# Patient Record
Sex: MALE | Race: WHITE | NOT HISPANIC OR LATINO | Employment: FULL TIME | URBAN - METROPOLITAN AREA
[De-identification: names, ages, dates, MRNs, and addresses within clinical notes are randomized per-mention and may not be internally consistent; named-entity substitution may affect disease eponyms.]

---

## 2018-10-24 ENCOUNTER — HOSPITAL ENCOUNTER (EMERGENCY)
Facility: HOSPITAL | Age: 28
Discharge: HOME | End: 2018-10-24
Attending: EMERGENCY MEDICINE | Admitting: EMERGENCY MEDICINE
Payer: COMMERCIAL

## 2018-10-24 ENCOUNTER — APPOINTMENT (EMERGENCY)
Dept: CT IMAGING | Facility: HOSPITAL | Age: 28
End: 2018-10-24
Payer: COMMERCIAL

## 2018-10-24 ENCOUNTER — OFFICE VISIT (OUTPATIENT)
Dept: URGENT CARE | Facility: CLINIC | Age: 28
End: 2018-10-24
Payer: COMMERCIAL

## 2018-10-24 ENCOUNTER — TELEPHONE (OUTPATIENT)
Dept: UROLOGY | Facility: MEDICAL CENTER | Age: 28
End: 2018-10-24

## 2018-10-24 VITALS
BODY MASS INDEX: 21.67 KG/M2 | RESPIRATION RATE: 20 BRPM | TEMPERATURE: 97.8 F | OXYGEN SATURATION: 96 % | DIASTOLIC BLOOD PRESSURE: 72 MMHG | WEIGHT: 160 LBS | HEIGHT: 72 IN | HEART RATE: 64 BPM | SYSTOLIC BLOOD PRESSURE: 122 MMHG

## 2018-10-24 VITALS
HEART RATE: 89 BPM | TEMPERATURE: 97.9 F | DIASTOLIC BLOOD PRESSURE: 57 MMHG | SYSTOLIC BLOOD PRESSURE: 121 MMHG | OXYGEN SATURATION: 98 % | RESPIRATION RATE: 18 BRPM

## 2018-10-24 DIAGNOSIS — R31.0 GROSS HEMATURIA: ICD-10-CM

## 2018-10-24 DIAGNOSIS — R35.0 URINARY FREQUENCY: ICD-10-CM

## 2018-10-24 DIAGNOSIS — R10.9 RIGHT FLANK PAIN: Primary | ICD-10-CM

## 2018-10-24 DIAGNOSIS — K52.9 COLITIS, ACUTE: Primary | ICD-10-CM

## 2018-10-24 DIAGNOSIS — R31.29 MICROHEMATURIA: ICD-10-CM

## 2018-10-24 LAB
ANION GAP SERPL CALCULATED.3IONS-SCNC: 6 MMOL/L (ref 4–13)
BASOPHILS # BLD AUTO: 0 THOUSANDS/ΜL (ref 0–0.1)
BASOPHILS NFR BLD AUTO: 1 % (ref 0–2)
BUN SERPL-MCNC: 20 MG/DL (ref 7–25)
CALCIUM SERPL-MCNC: 9.6 MG/DL (ref 8.6–10.5)
CHLORIDE SERPL-SCNC: 101 MMOL/L (ref 98–107)
CO2 SERPL-SCNC: 32 MMOL/L (ref 21–31)
CREAT SERPL-MCNC: 1.02 MG/DL (ref 0.7–1.3)
EOSINOPHIL # BLD AUTO: 0.1 THOUSAND/ΜL (ref 0–0.61)
EOSINOPHIL NFR BLD AUTO: 2 % (ref 0–5)
ERYTHROCYTE [DISTWIDTH] IN BLOOD BY AUTOMATED COUNT: 11.6 % (ref 11.5–14.5)
GFR SERPL CREATININE-BSD FRML MDRD: 101 ML/MIN/1.73SQ M
GLUCOSE SERPL-MCNC: 89 MG/DL (ref 65–99)
HCT VFR BLD AUTO: 47.9 % (ref 36.5–49.3)
HGB BLD-MCNC: 16 G/DL (ref 14–18)
LYMPHOCYTES # BLD AUTO: 1.2 THOUSANDS/ΜL (ref 0.6–4.47)
LYMPHOCYTES NFR BLD AUTO: 24 % (ref 21–51)
MCH RBC QN AUTO: 31.6 PG (ref 26–34)
MCHC RBC AUTO-ENTMCNC: 33.3 G/DL (ref 31–37)
MCV RBC AUTO: 95 FL (ref 81–99)
MONOCYTES # BLD AUTO: 0.5 THOUSAND/ΜL (ref 0.17–1.22)
MONOCYTES NFR BLD AUTO: 10 % (ref 2–12)
NEUTROPHILS # BLD AUTO: 3.2 THOUSANDS/ΜL (ref 1.4–6.5)
NEUTS SEG NFR BLD AUTO: 64 % (ref 42–75)
NRBC BLD AUTO-RTO: 0 /100 WBCS
PLATELET # BLD AUTO: 229 THOUSANDS/UL (ref 149–390)
PMV BLD AUTO: 7.8 FL (ref 8.6–11.7)
POTASSIUM SERPL-SCNC: 3.9 MMOL/L (ref 3.5–5.5)
RBC # BLD AUTO: 5.05 MILLION/UL (ref 4.3–5.9)
SL AMB  POCT GLUCOSE, UA: ABNORMAL
SL AMB LEUKOCYTE ESTERASE,UA: ABNORMAL
SL AMB POCT BILIRUBIN,UA: ABNORMAL
SL AMB POCT BLOOD,UA: ABNORMAL
SL AMB POCT CLARITY,UA: CLEAR
SL AMB POCT COLOR,UA: YELLOW
SL AMB POCT KETONES,UA: ABNORMAL
SL AMB POCT NITRITE,UA: ABNORMAL
SL AMB POCT PH,UA: 6
SL AMB POCT SPECIFIC GRAVITY,UA: 1.02
SL AMB POCT URINE PROTEIN: ABNORMAL
SL AMB POCT UROBILINOGEN: 0.2
SODIUM SERPL-SCNC: 139 MMOL/L (ref 134–143)
WBC # BLD AUTO: 5 THOUSAND/UL (ref 4.8–10.8)

## 2018-10-24 PROCEDURE — 87086 URINE CULTURE/COLONY COUNT: CPT | Performed by: PHYSICIAN ASSISTANT

## 2018-10-24 PROCEDURE — S9088 SERVICES PROVIDED IN URGENT: HCPCS | Performed by: PHYSICIAN ASSISTANT

## 2018-10-24 PROCEDURE — 74176 CT ABD & PELVIS W/O CONTRAST: CPT

## 2018-10-24 PROCEDURE — 96375 TX/PRO/DX INJ NEW DRUG ADDON: CPT

## 2018-10-24 PROCEDURE — 99203 OFFICE O/P NEW LOW 30 MIN: CPT | Performed by: PHYSICIAN ASSISTANT

## 2018-10-24 PROCEDURE — 36415 COLL VENOUS BLD VENIPUNCTURE: CPT | Performed by: EMERGENCY MEDICINE

## 2018-10-24 PROCEDURE — 99284 EMERGENCY DEPT VISIT MOD MDM: CPT

## 2018-10-24 PROCEDURE — 85025 COMPLETE CBC W/AUTO DIFF WBC: CPT | Performed by: EMERGENCY MEDICINE

## 2018-10-24 PROCEDURE — 81002 URINALYSIS NONAUTO W/O SCOPE: CPT | Performed by: PHYSICIAN ASSISTANT

## 2018-10-24 PROCEDURE — 96374 THER/PROPH/DIAG INJ IV PUSH: CPT

## 2018-10-24 PROCEDURE — 80048 BASIC METABOLIC PNL TOTAL CA: CPT | Performed by: EMERGENCY MEDICINE

## 2018-10-24 RX ORDER — KETOROLAC TROMETHAMINE 30 MG/ML
30 INJECTION, SOLUTION INTRAMUSCULAR; INTRAVENOUS ONCE
Status: COMPLETED | OUTPATIENT
Start: 2018-10-24 | End: 2018-10-24

## 2018-10-24 RX ORDER — TRAMADOL HYDROCHLORIDE 50 MG/1
50 TABLET ORAL EVERY 8 HOURS PRN
Qty: 12 TABLET | Refills: 0 | Status: SHIPPED | OUTPATIENT
Start: 2018-10-24 | End: 2018-10-31

## 2018-10-24 RX ORDER — METRONIDAZOLE 500 MG/1
500 TABLET ORAL EVERY 12 HOURS SCHEDULED
Qty: 14 TABLET | Refills: 0 | Status: SHIPPED | OUTPATIENT
Start: 2018-10-24 | End: 2018-10-31

## 2018-10-24 RX ORDER — MORPHINE SULFATE 4 MG/ML
4 INJECTION, SOLUTION INTRAMUSCULAR; INTRAVENOUS ONCE
Status: COMPLETED | OUTPATIENT
Start: 2018-10-24 | End: 2018-10-24

## 2018-10-24 RX ORDER — CIPROFLOXACIN 500 MG/1
500 TABLET, FILM COATED ORAL ONCE
Status: COMPLETED | OUTPATIENT
Start: 2018-10-24 | End: 2018-10-24

## 2018-10-24 RX ORDER — CIPROFLOXACIN 500 MG/1
500 TABLET, FILM COATED ORAL 2 TIMES DAILY
Qty: 14 TABLET | Refills: 0 | Status: SHIPPED | OUTPATIENT
Start: 2018-10-24 | End: 2018-10-31

## 2018-10-24 RX ORDER — METRONIDAZOLE 500 MG/1
500 TABLET ORAL ONCE
Status: COMPLETED | OUTPATIENT
Start: 2018-10-24 | End: 2018-10-24

## 2018-10-24 RX ADMIN — CIPROFLOXACIN HYDROCHLORIDE 500 MG: 500 TABLET, FILM COATED ORAL at 12:19

## 2018-10-24 RX ADMIN — METRONIDAZOLE 500 MG: 500 TABLET, FILM COATED ORAL at 12:19

## 2018-10-24 RX ADMIN — KETOROLAC TROMETHAMINE 30 MG: 30 INJECTION, SOLUTION INTRAMUSCULAR at 10:12

## 2018-10-24 RX ADMIN — MORPHINE SULFATE 4 MG: 4 INJECTION INTRAVENOUS at 11:30

## 2018-10-24 NOTE — TELEPHONE ENCOUNTER
Spoke with patient to schedule appointment for microhematuria  Patient scheduled 11/15/2018 at 0800 with Dr Yahir Aranda at the [place] office  Please send new patient paperwork     Thanks

## 2018-10-24 NOTE — ED PROVIDER NOTES
History  Chief Complaint   Patient presents with    Flank Pain     right  started Sunday getting worse  34 yr male with complaints of onset of sharp right flank pain x2 days some radiation to the right side of his abdomen  Pain is worse today  No dysuria, frequency or hematuria but states he had a UA an outpatient urgent care which showed some blood microscopically  No fever chills  No nausea vomiting diarrhea  No, congestion, chest pain or shortness of breath  History provided by:  Patient  Flank Pain   Pain location:  R flank  Pain quality: aching and sharp    Pain radiation: Right abdomen  Pain severity:  Moderate  Onset quality:  Gradual  Duration:  2 days  Timing:  Intermittent  Progression:  Waxing and waning  Chronicity:  New  Context: not recent travel and not sick contacts    Relieved by:  Nothing  Worsened by:  Nothing  Associated symptoms: no chest pain, no chills, no constipation, no cough, no diarrhea, no dysuria, no fever, no hematuria, no nausea, no shortness of breath, no sore throat and no vomiting        None       History reviewed  No pertinent past medical history  History reviewed  No pertinent surgical history  History reviewed  No pertinent family history  I have reviewed and agree with the history as documented  Social History   Substance Use Topics    Smoking status: Never Smoker    Smokeless tobacco: Never Used    Alcohol use Yes      Comment: social        Review of Systems   Constitutional: Negative for chills and fever  HENT: Negative for rhinorrhea and sore throat  Eyes: Negative for visual disturbance  Respiratory: Negative for cough and shortness of breath  Cardiovascular: Negative for chest pain and leg swelling  Gastrointestinal: Positive for abdominal pain  Negative for constipation, diarrhea, nausea and vomiting  Genitourinary: Positive for flank pain  Negative for dysuria and hematuria     Musculoskeletal: Negative for back pain and myalgias  Skin: Negative for rash  Neurological: Negative for dizziness and headaches  Psychiatric/Behavioral: Negative for confusion  All other systems reviewed and are negative  Physical Exam  Physical Exam   Constitutional: He is oriented to person, place, and time  He appears well-developed and well-nourished  HENT:   Nose: Nose normal    Mouth/Throat: Oropharynx is clear and moist  No oropharyngeal exudate  Eyes: Pupils are equal, round, and reactive to light  Conjunctivae and EOM are normal  No scleral icterus  Neck: Normal range of motion  Neck supple  No JVD present  No tracheal deviation present  Cardiovascular: Normal rate, regular rhythm and normal heart sounds  No murmur heard  Pulmonary/Chest: Effort normal and breath sounds normal  No respiratory distress  He has no wheezes  He has no rales  Abdominal: Soft  Bowel sounds are normal  There is no tenderness  There is no guarding  Positive right CVA tenderness   Musculoskeletal: Normal range of motion  He exhibits no edema or tenderness  No spinal tenderness   Neurological: He is alert and oriented to person, place, and time  No cranial nerve deficit or sensory deficit  He exhibits normal muscle tone  5/5 motor, nl sens   Skin: Skin is warm and dry  Psychiatric: He has a normal mood and affect  His behavior is normal    Nursing note and vitals reviewed        Vital Signs  ED Triage Vitals [10/24/18 0944]   Temperature Pulse Respirations Blood Pressure SpO2   98 3 °F (36 8 °C) 78 20 129/77 99 %      Temp Source Heart Rate Source Patient Position - Orthostatic VS BP Location FiO2 (%)   Temporal -- -- -- --      Pain Score       8           Vitals:    10/24/18 0944 10/24/18 1227   BP: 129/77 122/72   Pulse: 78 64       Visual Acuity      ED Medications  Medications   ketorolac (TORADOL) injection 30 mg (30 mg Intravenous Given 10/24/18 1012)   morphine (PF) 4 mg/mL injection 4 mg (4 mg Intravenous Given 10/24/18 1130) ciprofloxacin (CIPRO) tablet 500 mg (500 mg Oral Given 10/24/18 1219)   metroNIDAZOLE (FLAGYL) tablet 500 mg (500 mg Oral Given 10/24/18 1219)       Diagnostic Studies  Results Reviewed     Procedure Component Value Units Date/Time    Basic metabolic panel [[id number]]  (Abnormal) Collected:  10/24/18 1004    Lab Status:  Final result Specimen:  Blood from Arm, Right Updated:  10/24/18 1101     Sodium 139 mmol/L      Potassium 3 9 mmol/L      Chloride 101 mmol/L      CO2 32 (H) mmol/L      ANION GAP 6 mmol/L      BUN 20 mg/dL      Creatinine 1 02 mg/dL      Glucose 89 mg/dL      Calcium 9 6 mg/dL      eGFR 101 ml/min/1 73sq m     Narrative:         National Kidney Disease Education Program recommendations are as follows:  GFR calculation is accurate only with a steady state creatinine  Chronic Kidney disease less than 60 ml/min/1 73 sq  meters  Kidney failure less than 15 ml/min/1 73 sq  meters      CBC and differential [[id number]]  (Abnormal) Collected:  10/24/18 1004    Lab Status:  Final result Specimen:  Blood from Arm, Right Updated:  10/24/18 1024     WBC 5 00 Thousand/uL      RBC 5 05 Million/uL      Hemoglobin 16 0 g/dL      Hematocrit 47 9 %      MCV 95 fL      MCH 31 6 pg      MCHC 33 3 g/dL      RDW 11 6 %      MPV 7 8 (L) fL      Platelets 370 Thousands/uL      nRBC 0 /100 WBCs      Neutrophils Relative 64 %      Lymphocytes Relative 24 %      Monocytes Relative 10 %      Eosinophils Relative 2 %      Basophils Relative 1 %      Neutrophils Absolute 3 20 Thousands/µL      Lymphocytes Absolute 1 20 Thousands/µL      Monocytes Absolute 0 50 Thousand/µL      Eosinophils Absolute 0 10 Thousand/µL      Basophils Absolute 0 00 Thousands/µL                  CT abdomen pelvis wo contrast   Final Result by Amanda Joshi MD (10/24 1124)   Findings suggestive of colitis with inflammatory changes involving the   pericecal region with normal-caliber appendix and moderate appearing   distal transverse colon inflammatory changes with moderate wall thickening   and narrowed lumen suggested  Clinical correlation recommended  If   clinically indicated, CT abdomen and pelvis with IV and GI contrast may be   more helpful for further evaluation  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  No   hydronephrosis or hydroureter identified with above limitations  Limitations above  Signed by Pat Jewell MD                 Procedures  Procedures       Phone Contacts  ED Phone Contact    ED Course  ED Course as of Oct 24 1240   Wed Oct 24, 2018   1211 Outpt UA reviewed - cx pending    1211 Pt comfortable  Results of CT reviewed - Rx cipro, flagyl, ultram   Pt understands the need for outpt GI follow-up for colonoscopy and to return if he worsens  1212 CT abdomen pelvis wo contrast   7914 Will also f/u with urology given microhematuria and nonspecific UPJ findings on CT                                MDM  CritCare Time    Disposition  Final diagnoses:   Colitis, acute   Microhematuria     Time reflects when diagnosis was documented in both MDM as applicable and the Disposition within this note     Time User Action Codes Description Comment    10/24/2018 11:29 AM Kristineso العراقي A Add [K52 9] Colitis, acute     10/24/2018 12:13 PM Kristine Fesmarla A Add [R31 29] Microhematuria       ED Disposition     ED Disposition Condition Comment    Discharge  Lisa Hasting discharge to home/self care  Condition at discharge: Stable        Follow-up Information     Follow up With Specialties Details Why [address] [facility] Schedule an appointment as soon as possible for a visit in 1 day Return if you worsen or any new problems occur  Take medications as directed  Make an appointment to see a gastroenterologist as soon as possible for colonoscopy   [address] [id number] [phone] [facility] [address]  [address], [place], [place], [address] [facility] Schedule an appointment as soon as possible for a visit in 1 day For further evaluation of your blood in your urine  [address] [facility], [address] [place] [address], [phone]          Discharge Medication List as of 10/24/2018 12:18 PM      START taking these medications    Details   ciprofloxacin (CIPRO) 500 mg tablet Take 1 tablet (500 mg total) by mouth 2 (two) times a day for 7 days, Starting Wed 10/24/2018, Until Wed 10/31/2018, Normal      metroNIDAZOLE (FLAGYL) 500 mg tablet Take 1 tablet (500 mg total) by mouth every 12 (twelve) hours for 7 days, Starting Wed 10/24/2018, Until Wed 10/31/2018, Normal      traMADol (ULTRAM) 50 mg tablet Take 1 tablet (50 mg total) by mouth every 8 (eight) hours as needed for moderate pain for up to 12 doses, Starting Wed 10/24/2018, Print           No discharge procedures on file      ED Provider  Electronically Signed by           Esther Forbes MD  10/24/18 2810

## 2018-10-24 NOTE — PROGRESS NOTES
[facility]    NAME: Girard Homans is a 34 y o  male  : 1990    MRN: [id number]  DATE: 2018  TIME: 10:24 AM    Assessment and Plan   Right flank pain [R10 9]  1  Right flank pain  Transfer to other facility   2  Gross hematuria  Transfer to other facility   3  Urinary frequency  Urine culture    POCT urine dip       Patient Instructions     Patient Instructions   Referred patient to the emergency room for further evaluation of flank pain  Need to rule out kidney stone  He agrees and is going to go to go to [facility]       Chief Complaint     Chief Complaint   Patient presents with    Back Pain     Pt c/o mid back pain since   History of Present Illness   44-year-old male here with complaint of right back/flank pain for the last 4-5 days  Patient states that started out at 4 to 5/10 and in the past 2 days has increased to 7/10  Pain does not change with movements  It is there constantly  Patient just got over an upper respiratory infection last week  He denies any fever or chills  Denies any radiation of the pain  Denies nausea or vomiting  Pain does not change with eating  Denies any diarrhea or bowel changes  Noticed that his urine was darker yesterday  Denies any burning with urination  Review of Systems   Review of Systems   Constitutional: Negative for activity change, appetite change, chills, diaphoresis, fatigue, fever and unexpected weight change  HENT: Negative for congestion, ear pain, hearing loss, sinus pressure, sneezing, sore throat and tinnitus  Eyes: Negative for photophobia, redness and visual disturbance  Respiratory: Negative for apnea, cough, chest tightness, shortness of breath, wheezing and stridor  Cardiovascular: Negative for chest pain, palpitations and leg swelling  Gastrointestinal: Positive for abdominal pain  Negative for abdominal distention, blood in stool, constipation, diarrhea, nausea and vomiting  Endocrine: Negative for cold intolerance, heat intolerance, polydipsia, polyphagia and polyuria  Genitourinary: Positive for flank pain and hematuria  Negative for difficulty urinating, dysuria and urgency  Musculoskeletal: Positive for back pain  Negative for arthralgias, gait problem, myalgias, neck pain and neck stiffness  Skin: Negative for rash and wound  Neurological: Negative for dizziness, tremors, seizures, syncope, weakness, light-headedness, numbness and headaches  Hematological: Negative for adenopathy  Does not bruise/bleed easily  Psychiatric/Behavioral: Negative for agitation, behavioral problems, confusion and decreased concentration  The patient is not nervous/anxious  All other systems reviewed and are negative  Current Medications   No current facility-administered medications for this visit  No current outpatient prescriptions on file  Current Allergies     Allergies as of 10/24/2018    (No Known Allergies)          The following portions of the patient's history were reviewed and updated as appropriate: allergies, current medications, past family history, past medical history, past social history, past surgical history and problem list    No past medical history on file  No past surgical history on file  No family history on file  Social History     Social History    Marital status: Single     Spouse name: N/A    Number of children: N/A    Years of education: N/A     Occupational History    Not on file  Social History Main Topics    Smoking status: Never Smoker    Smokeless tobacco: Never Used    Alcohol use Yes      Comment: social    Drug use: Yes     Types: Marijuana    Sexual activity: Not on file     Other Topics Concern    Not on file     Social History Narrative    No narrative on file     Medications have been verified      Objective   /57   Pulse 89   Temp 97 9 °F (36 6 °C)   Resp 18   SpO2 98%      Physical Exam   Physical Exam Constitutional: He appears well-developed and well-nourished  No distress  HENT:   Head: Normocephalic  Right Ear: External ear normal    Left Ear: External ear normal    Nose: Nose normal    Mouth/Throat: Oropharynx is clear and moist  No oropharyngeal exudate  Neck: Normal range of motion  Neck supple  Cardiovascular: Normal rate, regular rhythm and normal heart sounds  No murmur heard  Pulmonary/Chest: Effort normal and breath sounds normal  No respiratory distress  He has no wheezes  He has no rales  Abdominal: Soft  Bowel sounds are normal  There is tenderness in the right upper quadrant  There is CVA tenderness (Right flank)  Musculoskeletal: Normal range of motion  Lymphadenopathy:     He has no cervical adenopathy  Skin: Skin is warm  No rash noted  Vitals reviewed

## 2018-10-24 NOTE — TELEPHONE ENCOUNTER
Reason for appointment/Complaint/Diagnosis : Right sided flank pain, micro hematuria     Insurance:     History of Cancer?  No                   If yes, what kind? N/A    Previous urologist?  No                  Records requested/where? No    Outside testing/where? No    Location Preference for office visit? [facility]     ** Pt visited [facility] 10/24 for flank pain, has CBC, BMP and Urine Culture there, discovered micro hematuria, says to fup in 1 day, please advise    [phone]    KT

## 2018-10-24 NOTE — DISCHARGE INSTRUCTIONS
Colitis   WHAT YOU NEED TO KNOW:   Colitis is swelling and irritation of your colon  Colitis may be caused by ulcers or a problem with your immune system  Bacteria, a virus, or a parasite may also cause colitis  The cause may not be known  You may have diarrhea, abdominal pain, fever, or blood or mucus in your bowel movement  DISCHARGE INSTRUCTIONS:   Return to the emergency department if:   · You have sudden trouble breathing  · Your bowel movements are black or have blood in them  · You have blood in your vomit  · You have severe abdominal pain or your abdomen is swollen and feels hard  · You have any of the following signs of dehydration:     ¨ Dizziness or weakness    ¨ Dry mouth, cracked lips, or severe thirst    ¨ Fast heartbeat or breathing    ¨ Urinating very little or not at all  Contact your healthcare provider if:   · Your symptoms get worse or do not go away  · You have a fever, chills, cough, or feel weak and achy  · You suddenly lose weight without trying  · You have questions or concerns about your condition or care  Medicines:   · Medicines  may be given to decrease inflammation in your colon and treat diarrhea  · Take your medicine as directed  Contact your healthcare provider if you think your medicine is not helping or if you have side effects  Tell him of her if you are allergic to any medicine  Keep a list of the medicines, vitamins, and herbs you take  Include the amounts, and when and why you take them  Bring the list or the pill bottles to follow-up visits  Carry your medicine list with you in case of an emergency  Manage your symptoms:   · Drink liquids as directed  to help prevent dehydration  Good liquids to drink include water, juice, and broth  Ask how much liquid to drink each day  You may need to drink an oral rehydration solution (ORS)  An ORS contains a balance of water, salt, and sugar to replace body fluids lost during diarrhea       · Eat a variety of healthy foods  Healthy foods include fruits, vegetables, whole-grain breads, beans, low-fat dairy products, lean meats, and fish  You may need to eat several small meals throughout the day instead of large meals  Avoid spicy foods, caffeine, chocolate, and foods high in fat  · Talk to your healthcare provider before you take NSAIDs  NSAIDs can cause worsen your symptoms if ulcers are causing your colitis  · Start to exercise when you feel better  Regular exercise helps your bowels work normally  Ask about the best exercise plan for you  Follow up with your healthcare provider as directed: You may need to return for a colonoscopy or other tests  Write down how often you have a bowel movements and what they look like  Bring this to your follow-up visits  Write down your questions so you remember to ask them during your visits  © 2017 [address] [employer] Information is for End User's use only and may not be sold, redistributed or otherwise used for commercial purposes  All illustrations and images included in CareNotes® are the copyrighted property of A D A M , Inc  or [employer]isten Banuelos  The above information is an  only  It is not intended as medical advice for individual conditions or treatments  Talk to your doctor, nurse or pharmacist before following any medical regimen to see if it is safe and effective for you  Hematuria   WHAT YOU NEED TO KNOW:   Hematuria is blood in your urine  Your urine may be bright red to dark brown  DISCHARGE INSTRUCTIONS:   Return to the emergency department if:   · You have blood in your urine after a new injury, such as a fall  · You are urinating very small amounts or not at all  · You feel like you cannot empty your bladder  · You have severe back or side pain that does not go away with treatment  Contact your healthcare provider if:   · You have a fever that gets worse or does not go away with treatment       · You cannot keep liquids or medicines down  · Your urine gets darker, even after you drink extra liquids  · You have questions or concerns about your condition, treatment, or care  Drink liquids as directed: You may need to drink extra liquids to help flush the blood from your body through your urine  Water is the best liquid to drink  Ask how much liquid to drink each day and which liquids are best for you  Follow up with your healthcare provider as directed:  Write down your questions so you remember to ask them during your visits  © 2017 [address] [employer] Information is for End User's use only and may not be sold, redistributed or otherwise used for commercial purposes  All illustrations and images included in CareNotes® are the copyrighted property of A D A M [employer] Inc  or Tristen Banuelos  The above information is an  only  It is not intended as medical advice for individual conditions or treatments  Talk to your doctor, nurse or pharmacist before following any medical regimen to see if it is safe and effective for you

## 2018-10-24 NOTE — PATIENT INSTRUCTIONS
Referred patient to the emergency room for further evaluation of flank pain  Need to rule out kidney stone    He agrees and is going to go to go to [facility]

## 2018-10-25 NOTE — TELEPHONE ENCOUNTER
Clifton Everett  Patient is scheduled now  I asked [employer] to double book patient for me and she did it this morning  Please send new patient paperwork    Thank You

## 2018-10-26 LAB — BACTERIA UR CULT: NORMAL

## 2018-10-31 ENCOUNTER — OFFICE VISIT (OUTPATIENT)
Dept: GASTROENTEROLOGY | Facility: HOSPITAL | Age: 28
End: 2018-10-31
Payer: COMMERCIAL

## 2018-10-31 VITALS
HEART RATE: 89 BPM | TEMPERATURE: 97.8 F | HEIGHT: 72 IN | BODY MASS INDEX: 21.4 KG/M2 | WEIGHT: 158 LBS | DIASTOLIC BLOOD PRESSURE: 72 MMHG | SYSTOLIC BLOOD PRESSURE: 125 MMHG

## 2018-10-31 DIAGNOSIS — R10.84 GENERALIZED ABDOMINAL PAIN: ICD-10-CM

## 2018-10-31 DIAGNOSIS — R93.3 ABNORMAL CT SCAN, COLON: ICD-10-CM

## 2018-10-31 DIAGNOSIS — R19.7 DIARRHEA, UNSPECIFIED TYPE: Primary | ICD-10-CM

## 2018-10-31 PROCEDURE — 99204 OFFICE O/P NEW MOD 45 MIN: CPT | Performed by: INTERNAL MEDICINE

## 2018-11-15 ENCOUNTER — OFFICE VISIT (OUTPATIENT)
Dept: UROLOGY | Facility: CLINIC | Age: 28
End: 2018-11-15
Payer: COMMERCIAL

## 2018-11-15 VITALS
SYSTOLIC BLOOD PRESSURE: 128 MMHG | BODY MASS INDEX: 21.94 KG/M2 | DIASTOLIC BLOOD PRESSURE: 80 MMHG | HEIGHT: 72 IN | WEIGHT: 162 LBS | HEART RATE: 76 BPM

## 2018-11-15 DIAGNOSIS — R31.29 MICROHEMATURIA: Primary | ICD-10-CM

## 2018-11-15 DIAGNOSIS — R19.7 DIARRHEA, UNSPECIFIED TYPE: ICD-10-CM

## 2018-11-15 DIAGNOSIS — B36.9 FUNGAL RASH OF TORSO: ICD-10-CM

## 2018-11-15 DIAGNOSIS — M41.26 OTHER IDIOPATHIC SCOLIOSIS, LUMBAR REGION: ICD-10-CM

## 2018-11-15 LAB
SL AMB  POCT GLUCOSE, UA: NORMAL
SL AMB LEUKOCYTE ESTERASE,UA: NORMAL
SL AMB POCT BILIRUBIN,UA: NORMAL
SL AMB POCT BLOOD,UA: NORMAL
SL AMB POCT CLARITY,UA: CLEAR
SL AMB POCT COLOR,UA: YELLOW
SL AMB POCT KETONES,UA: NORMAL
SL AMB POCT NITRITE,UA: NORMAL
SL AMB POCT PH,UA: 5
SL AMB POCT SPECIFIC GRAVITY,UA: 1.02
SL AMB POCT URINE PROTEIN: NORMAL
SL AMB POCT UROBILINOGEN: NORMAL

## 2018-11-15 PROCEDURE — 99244 OFF/OP CNSLTJ NEW/EST MOD 40: CPT | Performed by: UROLOGY

## 2018-11-15 PROCEDURE — 81002 URINALYSIS NONAUTO W/O SCOPE: CPT | Performed by: UROLOGY

## 2018-11-15 RX ORDER — NYSTATIN 100000 [USP'U]/G
POWDER TOPICAL 2 TIMES DAILY
Qty: 15 G | Refills: 0 | Status: SHIPPED | OUTPATIENT
Start: 2018-11-15

## 2018-11-15 NOTE — PROGRESS NOTES
Cathie Batch NOTE     CHIEF COMPLAINT   Claudia Jacksno is a 34 y o  male with a complaint of   Chief Complaint   Patient presents with    Microhematuria    Back Pain       History of Present Illness:     34 y o  male with a longstanding 8 or 9 year history of back pain  This has progressed over the last year  The patient feels well when he wakes from sleep but progressively over the day has lower lumbar pain  He has been using topical patches  He recently underwent a CT scan which demonstrated medullary calcinosis of the kidneys but no obstructing stone  The patient had the appearance of colitis or inflammation of the colon  He was also noted to have dip positive blood in his urine at that time  He denies all urinary symptoms  He has been having severe diarrhea for most of his life  He has seen gastroenterology and is due for colonoscopy in December  Past Medical History:     Past Medical History:   Diagnosis Date    History of heart murmur in childhood        PAST SURGICAL HISTORY:     Past Surgical History:   Procedure Laterality Date    WISDOM TOOTH EXTRACTION         CURRENT MEDICATIONS:     No current outpatient prescriptions on file  No current facility-administered medications for this visit          ALLERGIES:   No Known Allergies    SOCIAL HISTORY:     Social History     Social History    Marital status: Single     Spouse name: N/A    Number of children: N/A    Years of education: N/A     Social History Main Topics    Smoking status: Never Smoker    Smokeless tobacco: Never Used    Alcohol use Yes      Comment: social    Drug use: Yes     Types: Marijuana    Sexual activity: Not Asked     Other Topics Concern    None     Social History Narrative    None       SOCIAL HISTORY:     Family History   Problem Relation Age of Onset    Heart attack Maternal Grandmother     Cancer Maternal Grandfather     Diabetes Paternal Grandmother        REVIEW OF SYSTEMS:     Review of Systems   Constitutional: Negative  Respiratory: Negative  Cardiovascular: Negative  Gastrointestinal: Positive for diarrhea  Genitourinary: Negative  Musculoskeletal: Positive for back pain  Skin: Positive for rash  Psychiatric/Behavioral: Negative  PHYSICAL EXAM:     /80   Pulse 76   Ht 6' (1 829 m)   Wt 73 5 kg (162 lb)   BMI 21 97 kg/m²     General:  Healthy appearing male in no acute distress  They have a normal affect  There is not appear to be any gross neurologic defects or abnormalities  HEENT:  Normocephalic, atraumatic  Neck is supple without any palpable lymphadenopathy  Cardiovascular:  Patient has normal palpable distal radial pulses  There is no significant peripheral edema  No JVD is noted  Respiratory:  Patient has unlabored respirations  There is no audible wheeze or rhonchi  Abdomen:  Abdomen is without surgical scars  Abdomen is soft and nontender  There is no tympany  Inguinal and umbilical hernia are not appreciated  Genitourinary:  Circumcised phallus, orthotopic meatus, testicles descended and smooth without nodules, yeast groin rash    Musculoskeletal:  Patient does have moderate tenderness in the lumbosacral region with palpation or percussion  They full range of motion in all 4 extremities  Strength in all 4 extremities appears congruent  Patient is able to ambulate without assistance or difficulty  Dermatologic:  Patient has no skin abnormalities or rashes  LABS:     CBC:   Lab Results   Component Value Date    WBC 5 00 10/24/2018    HGB 16 0 10/24/2018    HCT 47 9 10/24/2018    MCV 95 10/24/2018     10/24/2018       BMP:   Lab Results   Component Value Date    CALCIUM 9 6 10/24/2018    K 3 9 10/24/2018    CO2 32 (H) 10/24/2018     10/24/2018    BUN 20 10/24/2018    CREATININE 1 02 10/24/2018       IMAGING:     10/24/18  INDICATION:  Right flank pain  Microscopic hematuria    Prior smoker      ORDERING PROVIDER: BRYANT SPENCE      TECHNIQUE:  CT abdomen and pelvis was performed without intravenous  contrast   Sagittal/coronal reconstructions        RADIATION AMOUNT:  271 50 mGy-cm      COMPARISON:  None available      FINDINGS: CT limited without IV contrast which decreases sensitivity for  neoplasm/infection  Abdomen: The visualized lower lung bases appear clear  Visualized distal esophagus  shows no large gross bulky lesion with above limitations      The liver, gallbladder, pancreas, and adrenals are suboptimally evaluated  on these unenhanced images, but demonstrate no acute pathology  No  hydronephrosis or hydroureter identified  However, diffuse increased  attenuation is noted of the bilateral renal corticomedullary junctions,  can be seen with medullary nephrocalcinosis  Nondistended stomach shows  no large gross bulky focal lesion      There is no free intraperitoneal air or large gross bulky lymph node  enlargement identified  Abdominal aorta is not aneurysmal grossly      Pelvis: There is no bowel wall thickening or finding of complete mechanical small  bowel obstruction identified  Appendix is identified and shows normal  caliber in the retrocecal region  Minimal medial pericecal slight  effacement of the fat planes is noted, nonspecific but may be  representative of mild inflammation of colitis  Moderate wall thickening  and mildly narrowed lumen suggested distal transverse colon, may represent  moderate inflammation of colitis with mild pericolonic effaced fat planes  suggested        There is no free fluid identified although relative paucity of adipose  tissue mildly limits evaluation in the lower abdomen and pelvis  Lymph  nodes visualized of the pelvis and inguinal regions show no large gross  bulky adenopathy with above limitations  Urinary bladder appears  moderately distended and shows no focal large gross bulky lesion or  intraluminal high attenuation calculus    Seminal vesicles appear  symmetric  Prostate grossly appears within normal limits  No  ventral/inguinal bowel hernia seen  Visualized subcutaneous and muscle  tissue show no large gross bulky lesion      Skeleton:    There are no acute fractures  No suspicious bony lesions  Mild scoliosis  noted, apex left L4-L5      IMPRESSION:  Findings suggestive of colitis with inflammatory changes involving the  pericecal region with normal-caliber appendix and moderate appearing  distal transverse colon inflammatory changes with moderate wall thickening  and narrowed lumen suggested  Clinical correlation recommended  If  clinically indicated, CT abdomen and pelvis with IV and GI contrast may be  more helpful for further evaluation      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  No  hydronephrosis or hydroureter identified with above limitations      Limitations above  ASSESSMENT:     34 y o  male with inflammatory bowel issues, progressive lumbar spinal issues with scoliosis an incidental dip positive blood in his urine    PLAN:     Patient has no urinary symptoms, no obstruction or hydronephrosis and minor medullary calcinosis  This does not appear to be urologic issue  The patient is due for a GI workup with colonoscopy which I would recommend that he continue with  I would not be surprised if he has some inflammatory or autoimmune bowel issue  Due to the lower back pain that appears to have started along with his GI issues, I will refer him to orthopedics  They can address his scoliosis and evaluate him for an inflammatory or autoimmune musculoskeletal issue  Patient will return to see me in 6 months with a formal urinalysis with microscopy  My hope is we will make some dramatic improvement in his care before then  I would not recommend any additional imaging or cystoscopy at this time  Patient has a fungal rash in the groin and so I have recommended some nystatin powder for him

## 2018-12-03 ENCOUNTER — ANESTHESIA EVENT (OUTPATIENT)
Dept: PERIOP | Facility: HOSPITAL | Age: 28
End: 2018-12-03
Payer: COMMERCIAL

## 2018-12-04 ENCOUNTER — HOSPITAL ENCOUNTER (OUTPATIENT)
Facility: HOSPITAL | Age: 28
Setting detail: OUTPATIENT SURGERY
Discharge: HOME | End: 2018-12-04
Attending: INTERNAL MEDICINE | Admitting: INTERNAL MEDICINE
Payer: COMMERCIAL

## 2018-12-04 ENCOUNTER — ANESTHESIA (OUTPATIENT)
Dept: PERIOP | Facility: HOSPITAL | Age: 28
End: 2018-12-04
Payer: COMMERCIAL

## 2018-12-04 VITALS
SYSTOLIC BLOOD PRESSURE: 133 MMHG | TEMPERATURE: 97.5 F | OXYGEN SATURATION: 99 % | RESPIRATION RATE: 18 BRPM | DIASTOLIC BLOOD PRESSURE: 65 MMHG | HEART RATE: 67 BPM

## 2018-12-04 DIAGNOSIS — R10.84 GENERALIZED ABDOMINAL PAIN: ICD-10-CM

## 2018-12-04 DIAGNOSIS — R93.3 ABNORMAL CT SCAN, COLON: ICD-10-CM

## 2018-12-04 DIAGNOSIS — R19.7 DIARRHEA, UNSPECIFIED TYPE: ICD-10-CM

## 2018-12-04 PROCEDURE — 88305 TISSUE EXAM BY PATHOLOGIST: CPT | Performed by: PATHOLOGY

## 2018-12-04 PROCEDURE — 45380 COLONOSCOPY AND BIOPSY: CPT | Performed by: INTERNAL MEDICINE

## 2018-12-04 RX ORDER — PROPOFOL 10 MG/ML
INJECTION, EMULSION INTRAVENOUS AS NEEDED
Status: DISCONTINUED | OUTPATIENT
Start: 2018-12-04 | End: 2018-12-04 | Stop reason: SURG

## 2018-12-04 RX ORDER — SODIUM CHLORIDE, SODIUM LACTATE, POTASSIUM CHLORIDE, CALCIUM CHLORIDE 600; 310; 30; 20 MG/100ML; MG/100ML; MG/100ML; MG/100ML
125 INJECTION, SOLUTION INTRAVENOUS CONTINUOUS
Status: DISCONTINUED | OUTPATIENT
Start: 2018-12-04 | End: 2018-12-04 | Stop reason: HOSPADM

## 2018-12-04 RX ORDER — PROPOFOL 10 MG/ML
INJECTION, EMULSION INTRAVENOUS CONTINUOUS PRN
Status: DISCONTINUED | OUTPATIENT
Start: 2018-12-04 | End: 2018-12-04 | Stop reason: SURG

## 2018-12-04 RX ORDER — MIDAZOLAM HYDROCHLORIDE 1 MG/ML
INJECTION INTRAMUSCULAR; INTRAVENOUS AS NEEDED
Status: DISCONTINUED | OUTPATIENT
Start: 2018-12-04 | End: 2018-12-04 | Stop reason: SURG

## 2018-12-04 RX ADMIN — PROPOFOL 50 MG: 10 INJECTION, EMULSION INTRAVENOUS at 11:36

## 2018-12-04 RX ADMIN — PROPOFOL 50 MG: 10 INJECTION, EMULSION INTRAVENOUS at 11:35

## 2018-12-04 RX ADMIN — PROPOFOL 50 MG: 10 INJECTION, EMULSION INTRAVENOUS at 11:38

## 2018-12-04 RX ADMIN — PROPOFOL 50 MG: 10 INJECTION, EMULSION INTRAVENOUS at 11:40

## 2018-12-04 RX ADMIN — SODIUM CHLORIDE, SODIUM LACTATE, POTASSIUM CHLORIDE, AND CALCIUM CHLORIDE 125 ML/HR: .6; .31; .03; .02 INJECTION, SOLUTION INTRAVENOUS at 10:41

## 2018-12-04 RX ADMIN — MIDAZOLAM HYDROCHLORIDE 2 MG: 1 INJECTION, SOLUTION INTRAMUSCULAR; INTRAVENOUS at 11:35

## 2018-12-04 RX ADMIN — PROPOFOL 50 MG: 10 INJECTION, EMULSION INTRAVENOUS at 11:43

## 2018-12-04 RX ADMIN — PROPOFOL 160 MCG/KG/MIN: 10 INJECTION, EMULSION INTRAVENOUS at 11:35

## 2018-12-04 NOTE — OP NOTE
OPERATIVE REPORT  PATIENT NAME: Donte Pinto    :  1990  MRN: [id number]  Pt Location: [facility] OR ROOM 03    SURGERY DATE: 2018    Surgeon(s) and Role:     Maryanne Bush MD - Primary    Preop Diagnosis:  Diarrhea, unspecified type [R19 7]  Generalized abdominal pain [R10 84]  Abnormal CT scan, colon [R93 3]    Post-Op Diagnosis Codes:     * Diarrhea, unspecified type [R19 7]     * Generalized abdominal pain [R10 84]     * Abnormal CT scan, colon [R93 3]    Procedure(s) (LRB):  COLONOSCOPY (N/A)    Specimen(s):  ID Type Source Tests Collected by Time Destination   1 : bx by cold bx forcep r/o IBD Tissue Terminal Ileum TISSUE EXAM Agustin Jimenez MD 2018 1146    2 : cecum/ asending colon bx by cold bx forcep r/o IBD Tissue Large Intestine, Cecum TISSUE EXAM Agustin Jimenez MD 2018 1148    3 : bx by cold bx forcep r/o IBD Tissue Large Intestine, Transverse Colon TISSUE EXAM Agustin Jimenez MD 2018 1150    4 : desending/ sigmoid bx by cold bx forcep r/o IBD Tissue Large Intestine, Left/Descending Colon TISSUE EXAM Agustin Jimenez MD 2018 1153    5 : bx by cold bx forcep r/o IBD Tissue Rectum TISSUE EXAM Agustin Jimenez MD 2018 1155      COLONOSCOPY    PROCEDURE: Colonoscopy/ Biopsy    INDICATIONS: Diarrhea, abnormal CT scan  POST-OP DIAGNOSIS: See the impression below    SEDATION: Monitored anesthesia care, check anesthesia records    PHYSICAL EXAM:    /60   Pulse 76   Temp (!) 97 4 °F (36 3 °C) (Temporal)   Resp 20   SpO2 98%   There is no height or weight on file to calculate BMI  General: NAD  Heart: S1 & S2 normal, RRR  Lungs: CTA, No rales or rhonchi  Abdomen: Soft, nontender, nondistended, good bowel sounds    CONSENT:  Informed consent was obtained for the procedure, including sedation after explaining the risks and benefits of the procedure  Risks including but not limited to bleeding, perforation, infection, aspiration were discussed in detail   Also explained about less than 100%$ sensitivity with the exam and other alternatives  PREPARATION:   EKG tracing, pulse oximetry, blood pressure were monitored throughout the procedure  Patient was identified by myself both verbally and by visual inspection of ID band  DESCRIPTION:   Patient was placed in the left lateral decubitus position and was sedated with the above medication  Digital rectal examination was performed  The colonoscope was introduced in to the anal canal and advanced up to cecum, which was identified by the appendiceal orifice and IC valve  A careful inspection was made as the colonoscope was withdrawn, including a retroflexed view of the rectum; findings and interventions are described below  Appropriate photodocumentation was obtained  The quality of the colonic preparation was good  FINDINGS:    The terminal ileum was normal   Random biopsies were done  The entire colon appeared to be normal   Random biopsies were done throughout the colon  No colitis was seen  IMPRESSIONS:      1  No evidence of colitis  Random biopsies were done in the terminal ileum and colon  RECOMMENDATIONS:    1  Follow up with the results of the biopsies with Dr Coby Keating in 2 weeks  2  Follow up with Dr Missy Fleming  COMPLICATIONS:  None; patient tolerated the procedure well      DISPOSITION: PACU           CONDITION: Stable

## 2018-12-04 NOTE — ANESTHESIA POSTPROCEDURE EVALUATION
Post-Op Assessment Note      CV Status:  Stable    Mental Status:  Alert and awake    Hydration Status:  Euvolemic    PONV Controlled:  Controlled    Airway Patency:  Patent    Post Op Vitals Reviewed: Yes          Staff: AnesthesiologistLOY           /60 (12/04/18 1201)    Temp (!) 97 4 °F (36 3 °C) (12/04/18 1201)    Pulse 76 (12/04/18 1201)   Resp 20 (12/04/18 1201)    SpO2 98 % (12/04/18 1201)

## 2018-12-04 NOTE — ANESTHESIA PREPROCEDURE EVALUATION
Review of Systems/Medical History  Patient summary reviewed  Chart reviewed  No history of anesthetic complications     Cardiovascular  Negative cardio ROS Exercise tolerance (METS): >4,     Pulmonary  Smoker (quit 6/2018) cigarette smoker  ,        GI/Hepatic  Negative GI/hepatic ROS     Comment: Abdominal cramping IBS     Negative  ROS        Endo/Other  Negative endo/other ROS      GYN       Hematology  Negative hematology ROS      Musculoskeletal  Negative musculoskeletal ROS        Neurology  Negative neurology ROS      Psychology   Negative psychology ROS              Physical Exam    Airway    Mallampati score: I  TM Distance: >3 FB  Neck ROM: full     Dental   No notable dental hx     Cardiovascular  Comment: Negative ROS, Cardiovascular exam normal    Pulmonary  Pulmonary exam normal     Other Findings        Anesthesia Plan  ASA Score- 1     Anesthesia Type- IV sedation with anesthesia with ASA Monitors  Additional Monitors:   Airway Plan:     Comment: Discussed with pt the possibility under sedation to have recall or mild discomfort        Plan Factors-    Induction- intravenous  Postoperative Plan-     Informed Consent- Anesthetic plan and risks discussed with patient  I personally reviewed this patient with the CRNA  Discussed and agreed on the Anesthesia Plan with the CRNA  Aziza Cross

## 2018-12-04 NOTE — H&P
History and Physical -  [facility]  Girard Homans 34 y o  male MRN: [id number]    HPI: Girard Homans is a 34y o  year old male who presents with abnormal CT scan and diarrhea  Ct showed colitis  Concern for IBD  Review of Systems    Historical Information   Past Medical History:   Diagnosis Date    History of heart murmur in childhood      Past Surgical History:   Procedure Laterality Date    WISDOM TOOTH EXTRACTION       Social History   History   Alcohol Use    Yes     Comment: social     History   Drug Use    Types: Marijuana     History   Smoking Status    Never Smoker   Smokeless Tobacco    Never Used     Family History   Problem Relation Age of Onset    Heart attack Maternal Grandmother     Cancer Maternal Grandfather     Diabetes Paternal Grandmother        Meds/Allergies     No prescriptions prior to admission  No Known Allergies    Objective     There were no vitals taken for this visit  PHYSICAL EXAM    Gen: NAD  CV: RRR  CHEST: Clear  ABD: soft, NT/ND  EXT: no edema  Neuro: AAO      ASSESSMENT/PLAN:  This is a 34y o  year old male here for Colonoscopy for colitis/ diarrhea rule out IBD  PLAN:   Procedure: Colonoscopy

## 2019-05-21 ENCOUNTER — HOSPITAL ENCOUNTER (EMERGENCY)
Facility: HOSPITAL | Age: 29
Discharge: HOME | End: 2019-05-21
Payer: COMMERCIAL

## 2019-05-21 ENCOUNTER — APPOINTMENT (EMERGENCY)
Dept: RADIOLOGY | Facility: HOSPITAL | Age: 29
End: 2019-05-21
Payer: COMMERCIAL

## 2019-05-21 VITALS
DIASTOLIC BLOOD PRESSURE: 69 MMHG | WEIGHT: 165 LBS | SYSTOLIC BLOOD PRESSURE: 134 MMHG | HEIGHT: 72 IN | OXYGEN SATURATION: 100 % | BODY MASS INDEX: 22.35 KG/M2 | HEART RATE: 82 BPM | RESPIRATION RATE: 18 BRPM

## 2019-05-21 DIAGNOSIS — R07.89 OTHER CHEST PAIN: Primary | ICD-10-CM

## 2019-05-21 LAB
ALBUMIN SERPL BCP-MCNC: 4.8 G/DL (ref 3.5–5.7)
ALP SERPL-CCNC: 66 U/L (ref 40–150)
ALT SERPL W P-5'-P-CCNC: 15 U/L (ref 7–52)
ANION GAP SERPL CALCULATED.3IONS-SCNC: 6 MMOL/L (ref 4–13)
APTT PPP: 35 SECONDS (ref 26–38)
AST SERPL W P-5'-P-CCNC: 17 U/L (ref 13–39)
BASOPHILS # BLD AUTO: 0 THOUSANDS/ΜL (ref 0–0.1)
BASOPHILS NFR BLD AUTO: 0 % (ref 0–2)
BILIRUB SERPL-MCNC: 0.7 MG/DL (ref 0.2–1)
BUN SERPL-MCNC: 12 MG/DL (ref 7–25)
CALCIUM SERPL-MCNC: 9.5 MG/DL (ref 8.6–10.5)
CHLORIDE SERPL-SCNC: 103 MMOL/L (ref 98–107)
CO2 SERPL-SCNC: 30 MMOL/L (ref 21–31)
CREAT SERPL-MCNC: 1.12 MG/DL (ref 0.7–1.3)
EOSINOPHIL # BLD AUTO: 0.1 THOUSAND/ΜL (ref 0–0.61)
EOSINOPHIL NFR BLD AUTO: 2 % (ref 0–5)
ERYTHROCYTE [DISTWIDTH] IN BLOOD BY AUTOMATED COUNT: 12 % (ref 11.5–14.5)
GFR SERPL CREATININE-BSD FRML MDRD: 90 ML/MIN/1.73SQ M
GLUCOSE SERPL-MCNC: 107 MG/DL (ref 65–99)
HCT VFR BLD AUTO: 42.4 % (ref 42–47)
HGB BLD-MCNC: 15.3 G/DL (ref 14–18)
INR PPP: 1.03 (ref 0.9–1.5)
LYMPHOCYTES # BLD AUTO: 1.9 THOUSANDS/ΜL (ref 0.6–4.47)
LYMPHOCYTES NFR BLD AUTO: 25 % (ref 21–51)
MCH RBC QN AUTO: 33.2 PG (ref 26–34)
MCHC RBC AUTO-ENTMCNC: 36.1 G/DL (ref 31–37)
MCV RBC AUTO: 92 FL (ref 81–99)
MONOCYTES # BLD AUTO: 0.6 THOUSAND/ΜL (ref 0.17–1.22)
MONOCYTES NFR BLD AUTO: 8 % (ref 2–12)
NEUTROPHILS # BLD AUTO: 5 THOUSANDS/ΜL (ref 1.4–6.5)
NEUTS SEG NFR BLD AUTO: 65 % (ref 42–75)
PLATELET # BLD AUTO: 243 THOUSANDS/UL (ref 149–390)
PLATELET BLD QL SMEAR: ADEQUATE
PMV BLD AUTO: 8 FL (ref 8.6–11.7)
POTASSIUM SERPL-SCNC: 3.9 MMOL/L (ref 3.5–5.5)
PROT SERPL-MCNC: 7 G/DL (ref 6.4–8.9)
PROTHROMBIN TIME: 11.9 SECONDS (ref 10.2–13)
RBC # BLD AUTO: 4.61 MILLION/UL (ref 4.3–5.9)
RBC MORPH BLD: NORMAL
SODIUM SERPL-SCNC: 139 MMOL/L (ref 134–143)
TROPONIN I SERPL-MCNC: <0.03 NG/ML
WBC # BLD AUTO: 7.7 THOUSAND/UL (ref 4.8–10.8)

## 2019-05-21 PROCEDURE — 85730 THROMBOPLASTIN TIME PARTIAL: CPT

## 2019-05-21 PROCEDURE — 96374 THER/PROPH/DIAG INJ IV PUSH: CPT

## 2019-05-21 PROCEDURE — 85025 COMPLETE CBC W/AUTO DIFF WBC: CPT

## 2019-05-21 PROCEDURE — 93005 ELECTROCARDIOGRAM TRACING: CPT

## 2019-05-21 PROCEDURE — 99285 EMERGENCY DEPT VISIT HI MDM: CPT

## 2019-05-21 PROCEDURE — 36415 COLL VENOUS BLD VENIPUNCTURE: CPT

## 2019-05-21 PROCEDURE — 80053 COMPREHEN METABOLIC PANEL: CPT

## 2019-05-21 PROCEDURE — 84484 ASSAY OF TROPONIN QUANT: CPT

## 2019-05-21 PROCEDURE — 85610 PROTHROMBIN TIME: CPT

## 2019-05-21 PROCEDURE — 71046 X-RAY EXAM CHEST 2 VIEWS: CPT

## 2019-05-21 RX ORDER — KETOROLAC TROMETHAMINE 10 MG/1
10 TABLET, FILM COATED ORAL EVERY 6 HOURS PRN
Qty: 12 TABLET | Refills: 0 | Status: SHIPPED | OUTPATIENT
Start: 2019-05-21

## 2019-05-21 RX ORDER — KETOROLAC TROMETHAMINE 30 MG/ML
30 INJECTION, SOLUTION INTRAMUSCULAR; INTRAVENOUS ONCE
Status: COMPLETED | OUTPATIENT
Start: 2019-05-21 | End: 2019-05-21

## 2019-05-21 RX ORDER — ASPIRIN 81 MG/1
324 TABLET, CHEWABLE ORAL ONCE
Status: COMPLETED | OUTPATIENT
Start: 2019-05-21 | End: 2019-05-21

## 2019-05-21 RX ORDER — LORAZEPAM 2 MG/ML
1 INJECTION INTRAMUSCULAR ONCE
Status: DISCONTINUED | OUTPATIENT
Start: 2019-05-21 | End: 2019-05-21 | Stop reason: HOSPADM

## 2019-05-21 RX ADMIN — KETOROLAC TROMETHAMINE 30 MG: 30 INJECTION, SOLUTION INTRAMUSCULAR; INTRAVENOUS at 20:24

## 2019-05-21 RX ADMIN — ASPIRIN 81 MG 324 MG: 81 TABLET ORAL at 19:47

## 2019-05-22 LAB
ATRIAL RATE: 80 BPM
P AXIS: 60 DEGREES
PR INTERVAL: 160 MS
QRS AXIS: 97 DEGREES
QRSD INTERVAL: 102 MS
QT INTERVAL: 366 MS
QTC INTERVAL: 422 MS
T WAVE AXIS: 81 DEGREES
VENTRICULAR RATE: 80 BPM

## 2019-05-22 PROCEDURE — 93010 ELECTROCARDIOGRAM REPORT: CPT | Performed by: INTERNAL MEDICINE

## 2020-04-03 ENCOUNTER — AMB VIDEO VISIT (OUTPATIENT)
Dept: URGENT CARE | Facility: CLINIC | Age: 30
End: 2020-04-03

## 2020-04-03 DIAGNOSIS — R05.9 COUGH: Primary | ICD-10-CM

## 2020-04-04 DIAGNOSIS — R05.9 COUGH: ICD-10-CM

## 2020-04-04 PROCEDURE — 87635 SARS-COV-2 COVID-19 AMP PRB: CPT

## 2020-04-06 LAB — SARS-COV-2 RNA SPEC QL NAA+PROBE: NOT DETECTED

## 2020-04-07 ENCOUNTER — TELEPHONE (OUTPATIENT)
Dept: URGENT CARE | Facility: CLINIC | Age: 30
End: 2020-04-07

## 2021-11-22 NOTE — PROGRESS NOTES
LV 11-5-21  NA 1-6-22  [facility] - Outpatient Consultation  Samuel Montemayor 34 y o  male MRN: [id number]  Encounter: [id number]          ASSESSMENT AND PLAN:      1  Diarrhea, unspecified type  2  Generalized abdominal pain  3  Abnormal CT scan, colon  I suspect his CT finding may be due to recent infectious colitis and he may have chronic irritable bowel syndrome given the lack of bleeding and weight loss  However does raise the possibility of inflammatory bowel disease such as Crohn's disease or ulcerative colitis  Given these concerns I will schedule him for colonoscopy to evaluate this further  In the meantime he can take Imodium as needed for his diarrhea symptoms  He can also try a low FODMAP diet  - Case request operating room: COLONOSCOPY    ______________________________________________________________________    HPI:  He presents for evaluation because of chronic abdominal pain and diarrhea over the past eight years  The symptoms have been intermittent and he has not had any associated bleeding or weight loss  Recently he had severe back pain and went to the emergency room for evaluation  As part of that evaluation he was found to have thickening of his colon wall consistent with colitis so he was referred to our office for evaluation  It was thought that his symptoms may have been due to a kidney stone and he was treated with pain medications and fortunately his symptoms of back pain have now improved  He still complains of some back pain and knee pain but he denies any reflux, nausea, vomiting, difficulty swallowing, and chest pain  He has never previously had an upper endoscopy or colonoscopy and he denies any family history of colon polyps or colon cancer  REVIEW OF SYSTEMS:    CONSTITUTIONAL: Denies any fever, chills, rigors, and weight loss  HEENT: No earache or tinnitus  Denies hearing loss or visual disturbances  CARDIOVASCULAR: No chest pain or palpitations  RESPIRATORY: Denies any cough, hemoptysis, shortness of breath or dyspnea on exertion  GASTROINTESTINAL: As noted in the History of Present Illness  GENITOURINARY: No problems with urination  Denies any hematuria or dysuria  NEUROLOGIC: No dizziness or vertigo, denies headaches  MUSCULOSKELETAL: Denies any muscle pain but has knee pains and back pain  SKIN: Denies skin rashes but has athlete's foot and jock itch  ENDOCRINE: Denies excessive thirst  Denies intolerance to heat or cold  PSYCHOSOCIAL: Denies depression or anxiety  Denies any recent memory loss  Historical Information   Past Medical History:   Diagnosis Date    History of heart murmur in childhood      Past Surgical History:   Procedure Laterality Date    WISDOM TOOTH EXTRACTION       Social History   History   Alcohol Use    Yes     Comment: social     History   Drug Use    Types: Marijuana     History   Smoking Status    Never Smoker   Smokeless Tobacco    Never Used     Family History   Problem Relation Age of Onset    Heart attack Maternal Grandmother     Cancer Maternal Grandfather     Diabetes Paternal Grandmother        Meds/Allergies       Current Outpatient Prescriptions:     ciprofloxacin (CIPRO) 500 mg tablet    metroNIDAZOLE (FLAGYL) 500 mg tablet    No Known Allergies        Objective     Blood pressure 125/72, pulse 89, temperature 97 8 °F (36 6 °C), temperature source Tympanic, height 6' (1 829 m), weight 71 7 kg (158 lb)  Body mass index is 21 43 kg/m²  PHYSICAL EXAM:      General Appearance:   Alert, cooperative, no distress   HEENT:   Normocephalic, atraumatic, anicteric      Neck:  Supple, symmetrical, trachea midline   Lungs:   Clear to auscultation bilaterally; no rales, rhonchi or wheezing; respirations unlabored    Heart[de-identified]   Regular rate and rhythm; no murmur, rub, or gallop     Abdomen:   Soft, non-tender, non-distended; normal bowel sounds; no masses, no organomegaly    Genitalia:   Deferred  Rectal:   Deferred    Extremities:  No cyanosis, clubbing or edema    Pulses:  2+ and symmetric    Skin:  No jaundice, rashes, or lesions    Lymph nodes:  No palpable cervical lymphadenopathy        Lab Results:   No visits with results within 1 Day(s) from this visit  Latest known visit with results is:   Admission on 10/24/2018, Discharged on 10/24/2018   Component Date Value    Sodium 10/24/2018 139     Potassium 10/24/2018 3 9     Chloride 10/24/2018 101     CO2 10/24/2018 32*    ANION GAP 10/24/2018 6     BUN 10/24/2018 20     Creatinine 10/24/2018 1 02     Glucose 10/24/2018 89     Calcium 10/24/2018 9 6     eGFR 10/24/2018 101     WBC 10/24/2018 5 00     RBC 10/24/2018 5 05     Hemoglobin 10/24/2018 16 0     Hematocrit 10/24/2018 47 9     MCV 10/24/2018 95     MCH 10/24/2018 31 6     MCHC 10/24/2018 33 3     RDW 10/24/2018 11 6     MPV 10/24/2018 7 8*    Platelets 40/83/7619 229     nRBC 10/24/2018 0     Neutrophils Relative 10/24/2018 64     Lymphocytes Relative 10/24/2018 24     Monocytes Relative 10/24/2018 10     Eosinophils Relative 10/24/2018 2     Basophils Relative 10/24/2018 1     Neutrophils Absolute 10/24/2018 3 20     Lymphocytes Absolute 10/24/2018 1 20     Monocytes Absolute 10/24/2018 0 50     Eosinophils Absolute 10/24/2018 0 10     Basophils Absolute 10/24/2018 0 00          Radiology Results:   Ct Abdomen Pelvis Wo Contrast    Result Date: 10/24/2018  Narrative: INDICATION:  Right flank pain  Microscopic hematuria  Prior smoker  ORDERING PROVIDER:  BRYANT SPENCE  TECHNIQUE:  CT abdomen and pelvis was performed without intravenous contrast   Sagittal/coronal reconstructions  RADIATION AMOUNT:  271 50 mGy-cm  COMPARISON:  None available  FINDINGS: CT limited without IV contrast which decreases sensitivity for neoplasm/infection  Abdomen: The visualized lower lung bases appear clear    Visualized distal esophagus shows no large gross bulky lesion with above limitations  The liver, gallbladder, pancreas, and adrenals are suboptimally evaluated on these unenhanced images, but demonstrate no acute pathology  No hydronephrosis or hydroureter identified  However, diffuse increased attenuation is noted of the bilateral renal corticomedullary junctions, can be seen with medullary nephrocalcinosis  Nondistended stomach shows no large gross bulky focal lesion  There is no free intraperitoneal air or large gross bulky lymph node enlargement identified  Abdominal aorta is not aneurysmal grossly  Pelvis: There is no bowel wall thickening or finding of complete mechanical small bowel obstruction identified  Appendix is identified and shows normal caliber in the retrocecal region  Minimal medial pericecal slight effacement of the fat planes is noted, nonspecific but may be representative of mild inflammation of colitis  Moderate wall thickening and mildly narrowed lumen suggested distal transverse colon, may represent moderate inflammation of colitis with mild pericolonic effaced fat planes suggested  There is no free fluid identified although relative paucity of adipose tissue mildly limits evaluation in the lower abdomen and pelvis  Lymph nodes visualized of the pelvis and inguinal regions show no large gross bulky adenopathy with above limitations  Urinary bladder appears moderately distended and shows no focal large gross bulky lesion or intraluminal high attenuation calculus  Seminal vesicles appear symmetric  Prostate grossly appears within normal limits  No ventral/inguinal bowel hernia seen  Visualized subcutaneous and muscle tissue show no large gross bulky lesion  Skeleton:  There are no acute fractures  No suspicious bony lesions  Mild scoliosis noted, apex left L4-L5       Impression: Findings suggestive of colitis with inflammatory changes involving the pericecal region with normal-caliber appendix and moderate appearing distal transverse colon inflammatory changes with moderate wall thickening and narrowed lumen suggested  Clinical correlation recommended  If clinically indicated, CT abdomen and pelvis with IV and GI contrast may be more helpful for further evaluation  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  No hydronephrosis or hydroureter identified with above limitations  Limitations above   Signed by Berny Jacobo MD

## (undated) DEVICE — CONMED SCOPE SAVER BITE BLOCK, 20X27 MM: Brand: SCOPE SAVER

## (undated) DEVICE — 2000CC GUARDIAN II: Brand: GUARDIAN

## (undated) DEVICE — FORCEPS BIOPSY ALLIGATOR JAW W/NEEDLE 2.8MM

## (undated) DEVICE — TUBING SUCTION 5MM X 12 FT

## (undated) DEVICE — LUBRICANT SURGILUBE TUBE 4 OZ  FLIP TOP